# Patient Record
Sex: MALE
[De-identification: names, ages, dates, MRNs, and addresses within clinical notes are randomized per-mention and may not be internally consistent; named-entity substitution may affect disease eponyms.]

---

## 2019-07-31 ENCOUNTER — HOSPITAL ENCOUNTER (EMERGENCY)
Dept: HOSPITAL 92 - ERS | Age: 34
Discharge: HOME | End: 2019-07-31
Payer: SELF-PAY
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
## 2019-07-31 DIAGNOSIS — S52.201A: Primary | ICD-10-CM

## 2019-07-31 PROCEDURE — 99156 MOD SED OTH PHYS/QHP 5/>YRS: CPT

## 2019-07-31 PROCEDURE — 96360 HYDRATION IV INFUSION INIT: CPT

## 2019-07-31 PROCEDURE — 25535 CLTX ULNAR SHFT FX W/MNPJ: CPT

## 2019-07-31 NOTE — RAD
RIGHT FOREARM 2 VIEWS:



HISTORY: injury, right forearm pain



FINDINGS:



There is a dorsally and radially displaced fracture of the distal shaft of the right ulna.



Reported By: Tanner Hall 

Electronically Signed:  7/31/2019 10:10 AM

## 2019-07-31 NOTE — RAD
Radiograph right forearm 2 views:



DATE:

7/31/2019



Time:

11:03 AM



HISTORY:

33-year-old male status post acute traumatic injury to forearm. Status post reduction, first attempt 
of ulnar fracture.



COMPARISON:

7/31/2019 at 9:52 AM



FINDINGS:

The fracture at the junction between middle and distal thirds of the ulnar diaphysis has been partial
ly reduced. There has been interval improvement in alignment. There is no longer angulation. There

is currently 20% shaft width ulnar displacement and 25-30% shaft width dorsal displacement, of distal
 fragment. Radius is intact.



IMPRESSION:

Interval improvement in alignment of distal ulnar shaft fracture.



Reported By: Rodriguez Yoo 

Electronically Signed:  7/31/2019 11:29 AM